# Patient Record
Sex: FEMALE | HISPANIC OR LATINO | URBAN - METROPOLITAN AREA
[De-identification: names, ages, dates, MRNs, and addresses within clinical notes are randomized per-mention and may not be internally consistent; named-entity substitution may affect disease eponyms.]

---

## 2018-04-10 ENCOUNTER — EMERGENCY (EMERGENCY)
Facility: HOSPITAL | Age: 33
LOS: 0 days | Discharge: HOME | End: 2018-04-10
Attending: EMERGENCY MEDICINE | Admitting: EMERGENCY MEDICINE
Payer: MEDICAID

## 2018-04-10 VITALS
DIASTOLIC BLOOD PRESSURE: 80 MMHG | SYSTOLIC BLOOD PRESSURE: 131 MMHG | OXYGEN SATURATION: 100 % | HEART RATE: 83 BPM | RESPIRATION RATE: 18 BRPM | TEMPERATURE: 98 F

## 2018-04-10 VITALS — HEIGHT: 61 IN | WEIGHT: 149.91 LBS

## 2018-04-10 DIAGNOSIS — O03.9 COMPLETE OR UNSPECIFIED SPONTANEOUS ABORTION WITHOUT COMPLICATION: ICD-10-CM

## 2018-04-10 DIAGNOSIS — N93.9 ABNORMAL UTERINE AND VAGINAL BLEEDING, UNSPECIFIED: ICD-10-CM

## 2018-04-10 LAB
APPEARANCE UR: CLEAR — SIGNIFICANT CHANGE UP
APTT BLD: 30.5 SEC — SIGNIFICANT CHANGE UP (ref 27.5–37.4)
BACTERIA # UR AUTO: (no result)
BASOPHILS # BLD AUTO: 0.03 K/UL — SIGNIFICANT CHANGE UP (ref 0–0.2)
BASOPHILS NFR BLD AUTO: 0.4 % — SIGNIFICANT CHANGE UP (ref 0–2)
BILIRUB UR-MCNC: NEGATIVE — SIGNIFICANT CHANGE UP
COLOR SPEC: YELLOW — SIGNIFICANT CHANGE UP
DIFF PNL FLD: (no result)
EOSINOPHIL # BLD AUTO: 0.08 K/UL — SIGNIFICANT CHANGE UP (ref 0–0.5)
EOSINOPHIL NFR BLD AUTO: 1 % — SIGNIFICANT CHANGE UP (ref 0–6)
EPI CELLS # UR: SIGNIFICANT CHANGE UP
GLUCOSE UR QL: NEGATIVE MG/DL — SIGNIFICANT CHANGE UP
HCG SERPL-ACNC: 1385 MIU/ML — SIGNIFICANT CHANGE UP
HCT VFR BLD CALC: 41.5 % — SIGNIFICANT CHANGE UP (ref 34.5–45)
HGB BLD-MCNC: 14.2 G/DL — SIGNIFICANT CHANGE UP (ref 11.5–15.5)
IMM GRANULOCYTES NFR BLD AUTO: 0.3 % — SIGNIFICANT CHANGE UP (ref 0–1.5)
INR BLD: 1.04 RATIO — SIGNIFICANT CHANGE UP (ref 0.88–1.16)
KETONES UR-MCNC: NEGATIVE — SIGNIFICANT CHANGE UP
LEUKOCYTE ESTERASE UR-ACNC: (no result)
LYMPHOCYTES # BLD AUTO: 2.5 K/UL — SIGNIFICANT CHANGE UP (ref 1–3.3)
LYMPHOCYTES # BLD AUTO: 32.7 % — SIGNIFICANT CHANGE UP (ref 13–44)
MCHC RBC-ENTMCNC: 30.9 PG — SIGNIFICANT CHANGE UP (ref 27–34)
MCHC RBC-ENTMCNC: 34.2 GM/DL — SIGNIFICANT CHANGE UP (ref 32–36)
MCV RBC AUTO: 90.2 FL — SIGNIFICANT CHANGE UP (ref 80–100)
MONOCYTES # BLD AUTO: 0.65 K/UL — SIGNIFICANT CHANGE UP (ref 0–0.9)
MONOCYTES NFR BLD AUTO: 8.5 % — SIGNIFICANT CHANGE UP (ref 2–14)
NEUTROPHILS # BLD AUTO: 4.36 K/UL — SIGNIFICANT CHANGE UP (ref 1.8–7.4)
NEUTROPHILS NFR BLD AUTO: 57.1 % — SIGNIFICANT CHANGE UP (ref 43–77)
NITRITE UR-MCNC: NEGATIVE — SIGNIFICANT CHANGE UP
NRBC # BLD: 0 /100 WBCS — SIGNIFICANT CHANGE UP (ref 0–0)
PH UR: 6 — SIGNIFICANT CHANGE UP (ref 5–8)
PLATELET # BLD AUTO: 281 K/UL — SIGNIFICANT CHANGE UP (ref 150–400)
PROT UR-MCNC: 15 MG/DL
PROTHROM AB SERPL-ACNC: 11.2 SEC — SIGNIFICANT CHANGE UP (ref 9.8–12.7)
RBC # BLD: 4.6 M/UL — SIGNIFICANT CHANGE UP (ref 3.8–5.2)
RBC # FLD: 11.7 % — SIGNIFICANT CHANGE UP (ref 10.3–14.5)
RBC CASTS # UR COMP ASSIST: (no result) /HPF (ref 0–4)
SP GR SPEC: 1.01 — SIGNIFICANT CHANGE UP (ref 1.01–1.02)
UROBILINOGEN FLD QL: NEGATIVE MG/DL — SIGNIFICANT CHANGE UP
WBC # BLD: 7.64 K/UL — SIGNIFICANT CHANGE UP (ref 3.8–10.5)
WBC # FLD AUTO: 7.64 K/UL — SIGNIFICANT CHANGE UP (ref 3.8–10.5)
WBC UR QL: (no result)

## 2018-04-10 PROCEDURE — 99285 EMERGENCY DEPT VISIT HI MDM: CPT

## 2018-04-10 PROCEDURE — 76830 TRANSVAGINAL US NON-OB: CPT | Mod: 26

## 2018-04-10 NOTE — ED STATDOCS - PROGRESS NOTE DETAILS
32 yr. old female PMH: Pregnant LMP 1/21/18 EDC 10/29  presents to ED with vaginal bleeding and cramps onset 10am while at work. Patient's first pregnancy. Ultrasound done yesterday at Dr. Eaton and Dr. Cordon. Denies any excessive bleeding. Seen and examined by attending in [place]. Plan: IV, Labs and US. Will F/U with results and re evaluate. Lela NP Reported results of Lab work and low beta HCG, reported US results and told she is having a miscarriage. Patient instructed to F/U with Dr. Cordon for another beta HCG and US. also told she will have increased vaginal bleeding. Lela BYRD    # [id number].

## 2018-04-10 NOTE — ED ADULT NURSE NOTE - OBJECTIVE STATEMENT
pt presents to ED with lower back pain and hematuria. pt c/o lower abd pain. denies vaginal bleeding. pt states she is 2 months pregnant. a&ox3. breathing si even and unlabored. afebrile. will continue to monitor and reassess

## 2018-04-10 NOTE — ED STATDOCS - OBJECTIVE STATEMENT
32 y-o Female presents to the ED c/o of vaginal bleeding with cramps. Pt states she is approximately two months pregnant with first pregnancy. Reports mild vaginal bleeding x 2 hrs ago. LNMP 1/21/18. Pt notes internal sonogram yesterday with Dr Abdi. Denies N/V/D, excessive hemorrhaging.

## 2018-04-10 NOTE — ED STATDOCS - MEDICAL DECISION MAKING DETAILS
32 y-o F presents with mild vaginal bleeding, x 2 months pregnant. Will give labs UA, US transvaginal

## 2018-04-12 ENCOUNTER — INPATIENT (INPATIENT)
Facility: HOSPITAL | Age: 33
LOS: 1 days | Discharge: HOME | End: 2018-04-14
Attending: OBSTETRICS & GYNECOLOGY | Admitting: OBSTETRICS & GYNECOLOGY
Payer: MEDICAID

## 2018-04-12 VITALS — HEIGHT: 61 IN | WEIGHT: 147.05 LBS

## 2018-04-12 DIAGNOSIS — D64.9 ANEMIA, UNSPECIFIED: ICD-10-CM

## 2018-04-12 DIAGNOSIS — O03.4 INCOMPLETE SPONTANEOUS ABORTION WITHOUT COMPLICATION: ICD-10-CM

## 2018-04-12 LAB
-  AMIKACIN: SIGNIFICANT CHANGE UP
-  AMOXICILLIN/CLAVULANIC ACID: SIGNIFICANT CHANGE UP
-  AMPICILLIN/SULBACTAM: SIGNIFICANT CHANGE UP
-  AMPICILLIN: SIGNIFICANT CHANGE UP
-  AZTREONAM: SIGNIFICANT CHANGE UP
-  CEFAZOLIN: SIGNIFICANT CHANGE UP
-  CEFEPIME: SIGNIFICANT CHANGE UP
-  CEFOXITIN: SIGNIFICANT CHANGE UP
-  CEFTRIAXONE: SIGNIFICANT CHANGE UP
-  CIPROFLOXACIN: SIGNIFICANT CHANGE UP
-  ERTAPENEM: SIGNIFICANT CHANGE UP
-  GENTAMICIN: SIGNIFICANT CHANGE UP
-  IMIPENEM: SIGNIFICANT CHANGE UP
-  LEVOFLOXACIN: SIGNIFICANT CHANGE UP
-  MEROPENEM: SIGNIFICANT CHANGE UP
-  NITROFURANTOIN: SIGNIFICANT CHANGE UP
-  PIPERACILLIN/TAZOBACTAM: SIGNIFICANT CHANGE UP
-  TIGECYCLINE: SIGNIFICANT CHANGE UP
-  TOBRAMYCIN: SIGNIFICANT CHANGE UP
-  TRIMETHOPRIM/SULFAMETHOXAZOLE: SIGNIFICANT CHANGE UP
ALBUMIN SERPL ELPH-MCNC: 3.5 G/DL — SIGNIFICANT CHANGE UP (ref 3.3–5)
ALP SERPL-CCNC: 50 U/L — SIGNIFICANT CHANGE UP (ref 40–120)
ALT FLD-CCNC: 16 U/L — SIGNIFICANT CHANGE UP (ref 12–78)
ANION GAP SERPL CALC-SCNC: 8 MMOL/L — SIGNIFICANT CHANGE UP (ref 5–17)
APTT BLD: 28.9 SEC — SIGNIFICANT CHANGE UP (ref 27.5–37.4)
AST SERPL-CCNC: 13 U/L — LOW (ref 15–37)
BASOPHILS # BLD AUTO: 0.02 K/UL — SIGNIFICANT CHANGE UP (ref 0–0.2)
BASOPHILS # BLD AUTO: 0.03 K/UL — SIGNIFICANT CHANGE UP (ref 0–0.2)
BASOPHILS NFR BLD AUTO: 0.1 % — SIGNIFICANT CHANGE UP (ref 0–2)
BASOPHILS NFR BLD AUTO: 0.2 % — SIGNIFICANT CHANGE UP (ref 0–2)
BILIRUB SERPL-MCNC: 0.4 MG/DL — SIGNIFICANT CHANGE UP (ref 0.2–1.2)
BLD GP AB SCN SERPL QL: SIGNIFICANT CHANGE UP
BUN SERPL-MCNC: 12 MG/DL — SIGNIFICANT CHANGE UP (ref 7–23)
CALCIUM SERPL-MCNC: 8.4 MG/DL — LOW (ref 8.5–10.1)
CHLORIDE SERPL-SCNC: 104 MMOL/L — SIGNIFICANT CHANGE UP (ref 96–108)
CO2 SERPL-SCNC: 26 MMOL/L — SIGNIFICANT CHANGE UP (ref 22–31)
CREAT SERPL-MCNC: 0.9 MG/DL — SIGNIFICANT CHANGE UP (ref 0.5–1.3)
CULTURE RESULTS: SIGNIFICANT CHANGE UP
EOSINOPHIL # BLD AUTO: 0 K/UL — SIGNIFICANT CHANGE UP (ref 0–0.5)
EOSINOPHIL # BLD AUTO: 0.01 K/UL — SIGNIFICANT CHANGE UP (ref 0–0.5)
EOSINOPHIL NFR BLD AUTO: 0 % — SIGNIFICANT CHANGE UP (ref 0–6)
EOSINOPHIL NFR BLD AUTO: 0.1 % — SIGNIFICANT CHANGE UP (ref 0–6)
GLUCOSE SERPL-MCNC: 134 MG/DL — HIGH (ref 70–99)
HCT VFR BLD CALC: 20.3 % — CRITICAL LOW (ref 34.5–45)
HCT VFR BLD CALC: 23.9 % — LOW (ref 34.5–45)
HCT VFR BLD CALC: 31.1 % — LOW (ref 34.5–45)
HGB BLD-MCNC: 10.8 G/DL — LOW (ref 11.5–15.5)
HGB BLD-MCNC: 6.9 G/DL — CRITICAL LOW (ref 11.5–15.5)
HGB BLD-MCNC: 8.3 G/DL — LOW (ref 11.5–15.5)
IMM GRANULOCYTES NFR BLD AUTO: 0.4 % — SIGNIFICANT CHANGE UP (ref 0–1.5)
IMM GRANULOCYTES NFR BLD AUTO: 0.5 % — SIGNIFICANT CHANGE UP (ref 0–1.5)
INR BLD: 1.05 RATIO — SIGNIFICANT CHANGE UP (ref 0.88–1.16)
LYMPHOCYTES # BLD AUTO: 1.38 K/UL — SIGNIFICANT CHANGE UP (ref 1–3.3)
LYMPHOCYTES # BLD AUTO: 15.2 % — SIGNIFICANT CHANGE UP (ref 13–44)
LYMPHOCYTES # BLD AUTO: 2.67 K/UL — SIGNIFICANT CHANGE UP (ref 1–3.3)
LYMPHOCYTES # BLD AUTO: 8.4 % — LOW (ref 13–44)
MCHC RBC-ENTMCNC: 31.2 PG — SIGNIFICANT CHANGE UP (ref 27–34)
MCHC RBC-ENTMCNC: 31.7 PG — SIGNIFICANT CHANGE UP (ref 27–34)
MCHC RBC-ENTMCNC: 34.7 GM/DL — SIGNIFICANT CHANGE UP (ref 32–36)
MCHC RBC-ENTMCNC: 34.7 GM/DL — SIGNIFICANT CHANGE UP (ref 32–36)
MCV RBC AUTO: 89.9 FL — SIGNIFICANT CHANGE UP (ref 80–100)
MCV RBC AUTO: 91.2 FL — SIGNIFICANT CHANGE UP (ref 80–100)
METHOD TYPE: SIGNIFICANT CHANGE UP
MONOCYTES # BLD AUTO: 0.23 K/UL — SIGNIFICANT CHANGE UP (ref 0–0.9)
MONOCYTES # BLD AUTO: 0.41 K/UL — SIGNIFICANT CHANGE UP (ref 0–0.9)
MONOCYTES NFR BLD AUTO: 1.4 % — LOW (ref 2–14)
MONOCYTES NFR BLD AUTO: 2.3 % — SIGNIFICANT CHANGE UP (ref 2–14)
NEUTROPHILS # BLD AUTO: 14.44 K/UL — HIGH (ref 1.8–7.4)
NEUTROPHILS # BLD AUTO: 14.63 K/UL — HIGH (ref 1.8–7.4)
NEUTROPHILS NFR BLD AUTO: 81.9 % — HIGH (ref 43–77)
NEUTROPHILS NFR BLD AUTO: 89.5 % — HIGH (ref 43–77)
NRBC # BLD: 0 /100 WBCS — SIGNIFICANT CHANGE UP (ref 0–0)
NRBC # BLD: 0 /100 WBCS — SIGNIFICANT CHANGE UP (ref 0–0)
ORGANISM # SPEC MICROSCOPIC CNT: SIGNIFICANT CHANGE UP
ORGANISM # SPEC MICROSCOPIC CNT: SIGNIFICANT CHANGE UP
PLATELET # BLD AUTO: 239 K/UL — SIGNIFICANT CHANGE UP (ref 150–400)
PLATELET # BLD AUTO: 270 K/UL — SIGNIFICANT CHANGE UP (ref 150–400)
POTASSIUM SERPL-MCNC: 4.1 MMOL/L — SIGNIFICANT CHANGE UP (ref 3.5–5.3)
POTASSIUM SERPL-SCNC: 4.1 MMOL/L — SIGNIFICANT CHANGE UP (ref 3.5–5.3)
PROT SERPL-MCNC: 6.7 GM/DL — SIGNIFICANT CHANGE UP (ref 6–8.3)
PROTHROM AB SERPL-ACNC: 11.4 SEC — SIGNIFICANT CHANGE UP (ref 9.8–12.7)
RBC # BLD: 2.62 M/UL — LOW (ref 3.8–5.2)
RBC # BLD: 3.46 M/UL — LOW (ref 3.8–5.2)
RBC # FLD: 11.6 % — SIGNIFICANT CHANGE UP (ref 10.3–14.5)
RBC # FLD: 11.6 % — SIGNIFICANT CHANGE UP (ref 10.3–14.5)
SODIUM SERPL-SCNC: 138 MMOL/L — SIGNIFICANT CHANGE UP (ref 135–145)
SPECIMEN SOURCE: SIGNIFICANT CHANGE UP
TYPE + AB SCN PNL BLD: SIGNIFICANT CHANGE UP
WBC # BLD: 16.35 K/UL — HIGH (ref 3.8–10.5)
WBC # BLD: 17.62 K/UL — HIGH (ref 3.8–10.5)
WBC # FLD AUTO: 16.35 K/UL — HIGH (ref 3.8–10.5)
WBC # FLD AUTO: 17.62 K/UL — HIGH (ref 3.8–10.5)

## 2018-04-12 PROCEDURE — 93010 ELECTROCARDIOGRAM REPORT: CPT

## 2018-04-12 PROCEDURE — 99285 EMERGENCY DEPT VISIT HI MDM: CPT

## 2018-04-12 PROCEDURE — 76817 TRANSVAGINAL US OBSTETRIC: CPT | Mod: 26

## 2018-04-12 RX ORDER — ONDANSETRON 8 MG/1
4 TABLET, FILM COATED ORAL ONCE
Qty: 0 | Refills: 0 | Status: COMPLETED | OUTPATIENT
Start: 2018-04-12 | End: 2018-04-12

## 2018-04-12 RX ORDER — SODIUM CHLORIDE 9 MG/ML
1000 INJECTION INTRAMUSCULAR; INTRAVENOUS; SUBCUTANEOUS ONCE
Qty: 0 | Refills: 0 | Status: COMPLETED | OUTPATIENT
Start: 2018-04-12 | End: 2018-04-12

## 2018-04-12 RX ORDER — SODIUM CHLORIDE 9 MG/ML
2000 INJECTION INTRAMUSCULAR; INTRAVENOUS; SUBCUTANEOUS ONCE
Qty: 0 | Refills: 0 | Status: COMPLETED | OUTPATIENT
Start: 2018-04-12 | End: 2018-04-12

## 2018-04-12 RX ADMIN — SODIUM CHLORIDE 2000 MILLILITER(S): 9 INJECTION INTRAMUSCULAR; INTRAVENOUS; SUBCUTANEOUS at 19:12

## 2018-04-12 RX ADMIN — SODIUM CHLORIDE 2000 MILLILITER(S): 9 INJECTION INTRAMUSCULAR; INTRAVENOUS; SUBCUTANEOUS at 17:04

## 2018-04-12 RX ADMIN — ONDANSETRON 4 MILLIGRAM(S): 8 TABLET, FILM COATED ORAL at 18:00

## 2018-04-12 RX ADMIN — Medication 0.2 MILLIGRAM(S): at 23:14

## 2018-04-12 RX ADMIN — Medication 0.2 MILLIGRAM(S): at 19:12

## 2018-04-12 NOTE — ED PROCEDURE NOTE - CPROC ED ANATOMIC LOCATION1
RUE: LAC#1 RIGHT FOREARM: +4.0 cm stellate shaped superficial LAC noted volar aspect of right forearm. NVI. No tendon deficit distal to injury. FROM. No bony deformity. 2+ distal pulses. No bleeding. No FBs.

## 2018-04-12 NOTE — PATIENT PROFILE ADULT. - LANGUAGE ASSISTANCE NEEDED
No-Patient/Caregiver offered and refused free interpretation services./Prefers brother in law to translate

## 2018-04-12 NOTE — ED PROVIDER NOTE - OBJECTIVE STATEMENT
33 y/o F with no significant PMHX presenting to the ED c/o of Vaginal bleeding with associated cramping since last night. Pt notes she is 8 weeks pregnant, first pregnancy, , reports going through more then ten pads ( more then one and hour). OBGYN Dr Luu. Pt given Misoprostol, which she took 600mg at 2pm. Pt also notes she has been vomiting x 2 with diarrhea x2 with associated Nausea.

## 2018-04-12 NOTE — ED STATDOCS - PHYSICAL EXAMINATION
GEN: AOX3, appears pale. HEENT: Throat clear. Head NC/AT. NECK: Supple, No JVD. FROM. C-spine non-tender. CV:S1S2, RRR, LUNGS: CTA b/l, no w/r/r. ABD: Soft, +Mild diffuse tenderness. no rebound, no guarding. No CVAT. EXT: No e/c/c. 2+ distal pulses. SKIN: No rashes. NEURO: No focal deficits. CN II-XII intact. FROM. 5/5 motor and sensory. LUIS ALBERTO Church

## 2018-04-12 NOTE — ED PROCEDURE NOTE - CPROC ED POST PROC CARE GUIDE1
Verbal/written post procedure instructions were given to patient/caregiver./Instructed patient/caregiver regarding signs and symptoms of infection./Keep the cast/splint/dressing clean and dry./Instructed patient/caregiver to follow-up with primary care physician.
Verbal/written post procedure instructions were given to patient/caregiver./Instructed patient/caregiver to follow-up with primary care physician./Instructed patient/caregiver regarding signs and symptoms of infection./Keep the cast/splint/dressing clean and dry.

## 2018-04-12 NOTE — ED STATDOCS - OBJECTIVE STATEMENT
32 y-o Female with no significant PMHX presents to the ED c/o of Vaginal bleeding with associated cramping since last night. Pt notes she is 8 weeks pregnant, Pt notes this is first pregnancy. Pt notes she has gone through more then ten pads ( more then one and hour). OBGYN Dr Luu. Pt given Misoprostol, which she took 600mg at 2pm. Pt also notes she has been vomiting x 2 with diarrhea x2 with associated Nausea. [place] interrupter #[id number]

## 2018-04-12 NOTE — ED STATDOCS - ENMT, MLM
Nasal mucosa clear.  Mouth with dry mucosa.  Throat has no vesicles, no oropharyngeal exudates and uvula is midline.

## 2018-04-12 NOTE — H&P ADULT - NSHPPHYSICALEXAM_GEN_ALL_CORE
Height (cm): 154.94 (04-12 @ 16:28), 154.94 (04-10 @ 11:40)  Weight (kg): 66.7 (04-12 @ 16:28), 68 (04-10 @ 11:40)  BMI (kg/m2): 27.8 (04-12 @ 16:28), 28.3 (04-10 @ 11:40)  BSA (m2): 1.66 (04-12 @ 16:28), 1.67 (04-10 @ 11:40)  Vital Signs Last 24 Hrs  T(C): 37.2 (12 Apr 2018 16:31), Max: 37.2 (12 Apr 2018 16:31)  T(F): 99 (12 Apr 2018 16:31), Max: 99 (12 Apr 2018 16:31)  HR: 90 (12 Apr 2018 18:00) (90 - 110)  BP: 115/55 (12 Apr 2018 18:00) (110/70 - 124/87)  BP(mean): --  RR: 18 (12 Apr 2018 18:00) (16 - 18)  SpO2: 99% (12 Apr 2018 18:00) (99% - 100%)  [ ] room air   [ ] 02    PHYSICAL EXAM:  GENERAL: pale  HEAD:  atraumatic, normocephalic   NERVOUS SYSTEM: AAOx3  CHEST/LUNG: Clear to auscultation bilaterally  HEART:  Regular rate and rhythm, No murmurs, rubs, or gallops  ABDOMEN:  soft, nontender, nondistended, positive bowel sounds   EXTREMITIES: No clubbing, cyanosis or edema  speculum exam, Cervex: open cervical os, no active bleeding, removed gestational tissues

## 2018-04-12 NOTE — H&P ADULT - HISTORY OF PRESENT ILLNESS
31 y/o F with no significant PMHX  presenting to the ED c/o of Vaginal bleeding with associated cramping since last night. Pt notes she is 8 weeks pregnant, reports going through more then ten pads since last night. Pt was seen in the office on Wed by Dr. Cordon and found to have fetal demise and was prescribed Misoprostol for which pt states took it today ~12pm and admits to increased bleeding with associated lightheadedness, n/v. Pt come to ED for further eval. In the ED pt was found H/H of 8.3/23.9 baseline 14 2 days ago. Pt was RRT for syncope during US evaluation after passing large clot. Pt denies cp, palpitations, fever, chills.

## 2018-04-12 NOTE — H&P ADULT - ASSESSMENT
31 y/o F with no significant PMHX  presenting to the ED c/o of Vaginal bleeding with associated cramping since last night admitted for incomplete .

## 2018-04-12 NOTE — H&P ADULT - PROBLEM SELECTOR PLAN 1
US transvaginal showed Large gestational sac in the cervix   speculum exam gestational tissue removed, no active bleeding at this time  -started on Methergine .2mg Q4 x 4 doses  -Repeat H/H at 8pm  -type and cross  -transfused as needed  -repeat US if still retained product of conception consider D&C

## 2018-04-12 NOTE — ED STATDOCS - ATTENDING CONTRIBUTION TO CARE
I, Eloisa Chang MD,  performed the initial face to face bedside interview with this patient regarding history of present illness, review of symptoms and relevant past medical, social and family history.  I completed an independent physical examination.  I was the initial provider who evaluated this patient. I have signed out the follow up of any pending tests (i.e. labs, radiological studies) to the ACP.  I have communicated the patient’s plan of care and disposition with the ACP.  The history, relevant review of systems, past medical and surgical history, medical decision making, and physical examination was documented by the scribe in my presence and I attest to the accuracy of the documentation.

## 2018-04-12 NOTE — ED STATDOCS - PROGRESS NOTE DETAILS
Attending Chang, rapid response called for pt while in u/s - pt with large vaginal bleed and then syncope.  Pt arrousable, plan ekg, repeat cbc.  OB called and spoke resident consult. Attending Chang, rapid response called for pt while in u/s - pt with large vaginal bleed and then syncope.  Pt arousable, plan ekg, repeat cbc.  OB called and spoke resident consult. Attending Chang, rapid response called for pt while in u/s - pt with large vaginal bleed and then syncope.  Pt moved to trauma rooms.  Pt arousable, plan ekg, repeat cbc.  OB called and spoke resident consult.

## 2018-04-12 NOTE — ED PROVIDER NOTE - PROGRESS NOTE DETAILS
Called to see pt in rapid response, just passed large clot with syncopal episode, pale, diaphoretic, opening arms, moving extremities, stat EKG ordered, will repeat CBC. Case discussed with Dr. Sommers on call for OB, recommends Methergine to be given right now. Will come down to bedside to see pt. Kera DO: to be admitted to ob/gyn service.

## 2018-04-12 NOTE — ED PROVIDER NOTE - CONSTITUTIONAL, MLM
normal... Pale appearing, well nourished, awake, alert, oriented to person, place, time/situation and in no apparent distress. Pale appearing,  awake, alert, oriented to person, place, time/situation and in no apparent distress.

## 2018-04-12 NOTE — H&P ADULT - PROBLEM SELECTOR PLAN 2
acute due to acute blood loss   -f/u repeat h/h, transfused as needed   -type and screen  -continue Methergine

## 2018-04-12 NOTE — ED POST DISCHARGE NOTE - DETAILS
Héctor Carr PA signed Marietta Pinto PA-C  ID [id number]. Left message for call back. Will send certified letter.

## 2018-04-12 NOTE — CHART NOTE - NSCHARTNOTEFT_GEN_A_CORE
Attending Note   pt seen in ED and examined.  pt with missed ab  treated with cytotec in doctors office.  pt questionably 8 weeks.    pt presented to ED with cramping and bleeding   sono revealed gestational sac in cervix  POC/gest sac removed from cervix in ED  bleeding sig decreased   will repeat CBC at 8pm and get repeat sono to assess for retained POC  may need to consider transfusion if HCT has decreased any further.  pt signed out to dr. adler  ob/gyn on call  case reviewed and discussed with resident  md saurabh

## 2018-04-12 NOTE — H&P ADULT - NSHPREVIEWOFSYSTEMS_GEN_ALL_CORE
REVIEW OF SYSTEMS:  CONSTITUTIONAL: No fever, chills,  fatigue   EYES: No  visual disturbances  RESPIRATORY: No cough, wheezing, No  hemoptysis; No shortness of breath  CARDIOVASCULAR: No chest pain, palpitations, leg swelling  GASTROINTESTINAL: No abdominal or epigastric pain. No nausea, No vomiting; No diarrhea or constipation.  GENITOURINARY: No dysuria, No frequency, No urgency, No hematuria, or incontinence  NEUROLOGICAL: alert and oriented x 3,  No headaches, No dizziness, No numbness,  MUSCULOSKELETAL: No joint pain or swelling; No muscle pain, No back pain, No extremity pain

## 2018-04-12 NOTE — ED ADULT NURSE REASSESSMENT NOTE - NS ED NURSE REASSESS COMMENT FT1
pt sent to ultrasound , during sonogram pt had episode of loose stool , moderate amount of blood , possible product of conception onto floor , pt appeared more pale, and had episode of unreponsiveness, rapid response called , pt sent to main ED ,  additional IV inserted , NS infusing

## 2018-04-12 NOTE — ED PROCEDURE NOTE - CPROC ED ANATOMIC LOCATION1
LAC#2 RIGHT ANTERIOR ELBOW: +2.0cm V-shaped superficial LAC noted volar aspect of right elbow. FROM. NO bony deformity. NVI. No tendon deficit distal to injury. 2+ distal pulses.

## 2018-04-13 LAB
APPEARANCE UR: (no result)
BACTERIA # UR AUTO: (no result)
BILIRUB UR-MCNC: NEGATIVE — SIGNIFICANT CHANGE UP
COLOR SPEC: (no result)
DIFF PNL FLD: (no result)
EPI CELLS # UR: (no result)
GLUCOSE UR QL: NEGATIVE MG/DL — SIGNIFICANT CHANGE UP
HCT VFR BLD CALC: 18.9 % — CRITICAL LOW (ref 34.5–45)
HCT VFR BLD CALC: 21.5 % — LOW (ref 34.5–45)
HGB BLD-MCNC: 6.4 G/DL — CRITICAL LOW (ref 11.5–15.5)
HGB BLD-MCNC: 7.3 G/DL — LOW (ref 11.5–15.5)
KETONES UR-MCNC: NEGATIVE — SIGNIFICANT CHANGE UP
LEUKOCYTE ESTERASE UR-ACNC: (no result)
MCHC RBC-ENTMCNC: 29.1 PG — SIGNIFICANT CHANGE UP (ref 27–34)
MCHC RBC-ENTMCNC: 33.9 GM/DL — SIGNIFICANT CHANGE UP (ref 32–36)
MCV RBC AUTO: 85.9 FL — SIGNIFICANT CHANGE UP (ref 80–100)
NITRITE UR-MCNC: NEGATIVE — SIGNIFICANT CHANGE UP
PH UR: 5 — SIGNIFICANT CHANGE UP (ref 5–8)
PLATELET # BLD AUTO: 186 K/UL — SIGNIFICANT CHANGE UP (ref 150–400)
PROT UR-MCNC: 30 MG/DL
RBC # BLD: 2.2 M/UL — LOW (ref 3.8–5.2)
RBC # FLD: 16.2 % — HIGH (ref 10.3–14.5)
RBC CASTS # UR COMP ASSIST: >50 /HPF (ref 0–4)
SP GR SPEC: 1.01 — SIGNIFICANT CHANGE UP (ref 1.01–1.02)
UROBILINOGEN FLD QL: NEGATIVE MG/DL — SIGNIFICANT CHANGE UP
WBC # BLD: 11.5 K/UL — HIGH (ref 3.8–10.5)
WBC # FLD AUTO: 11.5 K/UL — HIGH (ref 3.8–10.5)
WBC UR QL: SIGNIFICANT CHANGE UP

## 2018-04-13 PROCEDURE — 76830 TRANSVAGINAL US NON-OB: CPT | Mod: 26

## 2018-04-13 PROCEDURE — 88305 TISSUE EXAM BY PATHOLOGIST: CPT | Mod: 26

## 2018-04-13 RX ORDER — CALCIUM CARBONATE 500(1250)
3 TABLET ORAL EVERY 6 HOURS
Qty: 0 | Refills: 0 | Status: DISCONTINUED | OUTPATIENT
Start: 2018-04-13 | End: 2018-04-14

## 2018-04-13 RX ORDER — MAGNESIUM HYDROXIDE 400 MG/1
30 TABLET, CHEWABLE ORAL EVERY 6 HOURS
Qty: 0 | Refills: 0 | Status: DISCONTINUED | OUTPATIENT
Start: 2018-04-13 | End: 2018-04-14

## 2018-04-13 RX ORDER — FENTANYL CITRATE 50 UG/ML
50 INJECTION INTRAVENOUS
Qty: 0 | Refills: 0 | Status: DISCONTINUED | OUTPATIENT
Start: 2018-04-13 | End: 2018-04-13

## 2018-04-13 RX ORDER — ACETAMINOPHEN 500 MG
1000 TABLET ORAL ONCE
Qty: 0 | Refills: 0 | Status: COMPLETED | OUTPATIENT
Start: 2018-04-13 | End: 2018-04-13

## 2018-04-13 RX ORDER — ACETAMINOPHEN 500 MG
650 TABLET ORAL EVERY 6 HOURS
Qty: 0 | Refills: 0 | Status: DISCONTINUED | OUTPATIENT
Start: 2018-04-13 | End: 2018-04-13

## 2018-04-13 RX ORDER — SODIUM CHLORIDE 9 MG/ML
1000 INJECTION INTRAMUSCULAR; INTRAVENOUS; SUBCUTANEOUS
Qty: 0 | Refills: 0 | Status: DISCONTINUED | OUTPATIENT
Start: 2018-04-13 | End: 2018-04-13

## 2018-04-13 RX ORDER — SODIUM CHLORIDE 9 MG/ML
1000 INJECTION, SOLUTION INTRAVENOUS
Qty: 0 | Refills: 0 | Status: DISCONTINUED | OUTPATIENT
Start: 2018-04-13 | End: 2018-04-14

## 2018-04-13 RX ORDER — ZOLPIDEM TARTRATE 10 MG/1
5 TABLET ORAL AT BEDTIME
Qty: 0 | Refills: 0 | Status: DISCONTINUED | OUTPATIENT
Start: 2018-04-13 | End: 2018-04-14

## 2018-04-13 RX ORDER — MORPHINE SULFATE 50 MG/1
4 CAPSULE, EXTENDED RELEASE ORAL EVERY 4 HOURS
Qty: 0 | Refills: 0 | Status: DISCONTINUED | OUTPATIENT
Start: 2018-04-13 | End: 2018-04-14

## 2018-04-13 RX ORDER — KETOROLAC TROMETHAMINE 30 MG/ML
30 SYRINGE (ML) INJECTION ONCE
Qty: 0 | Refills: 0 | Status: DISCONTINUED | OUTPATIENT
Start: 2018-04-13 | End: 2018-04-14

## 2018-04-13 RX ORDER — OXYCODONE AND ACETAMINOPHEN 5; 325 MG/1; MG/1
1 TABLET ORAL EVERY 4 HOURS
Qty: 0 | Refills: 0 | Status: DISCONTINUED | OUTPATIENT
Start: 2018-04-13 | End: 2018-04-14

## 2018-04-13 RX ADMIN — Medication 0.2 MILLIGRAM(S): at 02:49

## 2018-04-13 RX ADMIN — Medication 0.2 MILLIGRAM(S): at 06:39

## 2018-04-13 RX ADMIN — SODIUM CHLORIDE 75 MILLILITER(S): 9 INJECTION, SOLUTION INTRAVENOUS at 21:38

## 2018-04-13 RX ADMIN — SODIUM CHLORIDE 75 MILLILITER(S): 9 INJECTION INTRAMUSCULAR; INTRAVENOUS; SUBCUTANEOUS at 21:28

## 2018-04-13 RX ADMIN — Medication 650 MILLIGRAM(S): at 00:50

## 2018-04-13 RX ADMIN — Medication 650 MILLIGRAM(S): at 00:21

## 2018-04-13 RX ADMIN — Medication 400 MILLIGRAM(S): at 21:28

## 2018-04-13 NOTE — PROGRESS NOTE ADULT - SUBJECTIVE AND OBJECTIVE BOX
HD#1  Completing Missed  with anemia    Pt denies sig uterine cramping, has much lighter vaginal bleeding now.  Understands the plan of mgt for today.    Vital Signs Last 24 Hrs  T(C): 37.2 (2018 00:01), Max: 37.6 (2018 22:00)  T(F): 99 (2018 00:01), Max: 99.6 (2018 22:00)  HR: 87 (2018 00:01) (87 - 110)  BP: 106/47 (2018 00:01) (100/42 - 124/87)  RR: 17 (2018 00:01) (16 - 19)  SpO2: 100% (2018 00:01) (99% - 100%)    Gen:  appropriate, pallor, appears fatigued  Pul:  clear  Cor:  mild sinus tachycardia  Abd:  soft, NT  :  no active bright red bleeding.  Extr:  NT                          7.3    x     )-----------( x        ( 2018 06:45 )             21.5     A/P:  Completing passage of missed  with anemia  Hgb stable at 7.3 from 6.9, no active bleeding presently.  Pending repeat transvaginal sonogram for possible retained products of conception.  Discharge home if completed ab per sono and hemodynamically stable.  Suction D+C if incomplete ab per sono.    No indication for transfusion at this time.

## 2018-04-13 NOTE — PROGRESS NOTE ADULT - SUBJECTIVE AND OBJECTIVE BOX
US reviewed, retained products of conception  patient felt dizzy earlier during the day and feels palpitations  P-100  discussed cytotec VS D and C with patient   Patient and family wishes to proceed with D and C. Risks benefits alternatives, including possibility of blood transfusion discussed with patient. Verbalized understanding, agrees with D and C.  NPO  Prepare for Surgery  Dr Cordon informed

## 2018-04-13 NOTE — PROGRESS NOTE ADULT - SUBJECTIVE AND OBJECTIVE BOX
Post Op Note    Pt denies any complaints postoperatively from suction D+C.  Questions from family answered.  Discharge in am anticipated.    Vital Signs Last 24 Hrs  T(C): 36.5 (2018 22:15), Max: 37.6 (2018 16:43)  T(F): 97.7 (2018 22:15), Max: 99.6 (2018 16:43)  HR: 98 (2018 23:00) (87 - 106)  BP: 105/55 (2018 23:00) (90/40 - 115/57)  RR: 18 (2018 23:00) (13 - 23)  SpO2: 100% (2018 23:00) (99% - 100%)    Cor:  S1S2 RRR  Pul:  clear  Abd:  soft, NT  :  no active bleeding  Extr:  NT                          6.4    11.50 )-----------( 186      ( 2018 23:08 )             18.9     A/P:  Suction D+C for incomplete , symptomatic anemia   Transfuse 2 units PRBCs  check CBC 2 hours post transfusion.

## 2018-04-13 NOTE — BRIEF OPERATIVE NOTE - PROCEDURE
<<-----Click on this checkbox to enter Procedure Dilation and curettage for retained products of conception  04/13/2018    Active  [id number]

## 2018-04-14 VITALS — HEART RATE: 98 BPM | DIASTOLIC BLOOD PRESSURE: 49 MMHG | SYSTOLIC BLOOD PRESSURE: 93 MMHG

## 2018-04-14 LAB
HCT VFR BLD CALC: 26.3 % — LOW (ref 34.5–45)
HGB BLD-MCNC: 9 G/DL — LOW (ref 11.5–15.5)
NRBC # BLD: 0 /100 WBCS — SIGNIFICANT CHANGE UP (ref 0–0)

## 2018-04-14 RX ORDER — OXYCODONE HYDROCHLORIDE 5 MG/1
1 TABLET ORAL
Qty: 10 | Refills: 0 | OUTPATIENT
Start: 2018-04-14

## 2018-04-14 RX ORDER — IBUPROFEN 200 MG
1 TABLET ORAL
Qty: 30 | Refills: 0 | OUTPATIENT
Start: 2018-04-14 | End: 2018-05-07

## 2018-04-14 RX ORDER — FERROUS SULFATE 325(65) MG
1 TABLET ORAL
Qty: 60 | Refills: 5 | OUTPATIENT
Start: 2018-04-14 | End: 2018-10-10

## 2018-04-14 RX ADMIN — OXYCODONE AND ACETAMINOPHEN 1 TABLET(S): 5; 325 TABLET ORAL at 09:29

## 2018-04-14 RX ADMIN — OXYCODONE AND ACETAMINOPHEN 1 TABLET(S): 5; 325 TABLET ORAL at 10:20

## 2018-04-14 RX ADMIN — Medication 30 MILLIGRAM(S): at 06:30

## 2018-04-14 NOTE — DISCHARGE NOTE ADULT - PLAN OF CARE
recovery anticipate regular menses to resume in six weeks.  follow up in office in one to two weeks.

## 2018-04-14 NOTE — DISCHARGE NOTE ADULT - MEDICATION SUMMARY - MEDICATIONS TO TAKE
I will START or STAY ON the medications listed below when I get home from the hospital:    ibuprofen 600 mg oral tablet  -- 1 tab(s) by mouth every 6 hours, As needed, Moderate Pain -for moderate pain MDD:4  -- Indication: For for moderate pain    acetaminophen-oxyCODONE 325 mg-5 mg oral tablet  -- 1 tab(s) by mouth every 4 hours, As needed for severe or Moderate postoperative Pain (4 - 6) MDD:8 tablets  -- Indication: For for severe pain only    Feosol 200 mg (65 mg elemental iron) oral tablet  -- 1 tab(s) by mouth 2 times a day for anemia  -- May discolor urine or feces.    -- Indication: For for anemia

## 2018-04-14 NOTE — DISCHARGE NOTE ADULT - CARE PLAN
Principal Discharge DX:	Incomplete   Goal:	recovery  Assessment and plan of treatment:	anticipate regular menses to resume in six weeks.  follow up in office in one to two weeks.

## 2018-04-14 NOTE — PROGRESS NOTE ADULT - SUBJECTIVE AND OBJECTIVE BOX
Patient feeling better, no complaints, asymptomatic  s/p 2U PRBC overnight  Heart and lungs wnl  abdomen soft, non tender  minimal blood on pad, no active bleeding  no calf tenderness  cbc pending

## 2018-04-14 NOTE — DISCHARGE NOTE ADULT - HOSPITAL COURSE
31 yo  at 6-8 weeks gestation with known missed  presented to ER c/o vaginal bleeding after taking first dose of misoprostol.  Pt was noted to have symptomatic anemia and was given one unit of PRBC in the ER.  Transvaginal sonogram showed gestational sac in the cervix.  Gestational sac was removed per vagina by Gyn attending on call and pt was further monitored for bleeding.  Her Hgb dropped from 14 to 6.9 and she was scheduled for repeat sonogram and possible suction dilatation and curettage.  Pt was kept NPO for surgery which was performed on Hospital Day #1 in the evening.  Suction D+C was uneventfully performed.  Pt continued to be tachycardic and hypotensive, repeat Hgb was 6.4.  Two additional units of blood were administered postoperatively appropriately raising her Hgb to 9.0.  She has reported no further active vaginal bleeding.  Pt will be discharged home on Hospital Day #2 if she feels no further symptoms of anemia to follow up within one week.  She has been prescribed iron supplementation and analgesia.  Precautions reviewed with her and her family.

## 2018-04-14 NOTE — DISCHARGE NOTE ADULT - CARE PROVIDER_API CALL
Elder Cordon (DO), Obstetrics and Gynecology  [address] [place] [address]  Phone: [phone]  Fax: [phone]

## 2018-04-14 NOTE — DISCHARGE NOTE ADULT - PATIENT PORTAL LINK FT
You can access the [facility] Patient Portal, offered by [facility], by registering with the following website: [email or web address]

## 2018-04-17 DIAGNOSIS — O03.4 INCOMPLETE SPONTANEOUS ABORTION WITHOUT COMPLICATION: ICD-10-CM

## 2018-04-17 DIAGNOSIS — O02.1 MISSED ABORTION: ICD-10-CM

## 2018-04-17 DIAGNOSIS — D62 ACUTE POSTHEMORRHAGIC ANEMIA: ICD-10-CM

## 2018-04-17 LAB
SURGICAL PATHOLOGY FINAL REPORT - CH: SIGNIFICANT CHANGE UP
SURGICAL PATHOLOGY FINAL REPORT - CH: SIGNIFICANT CHANGE UP

## 2020-11-17 NOTE — ED STATDOCS - ATTENDING CONTRIBUTION TO CARE
0740 Bedside and Verbal shift change report given to LAURI Torres RN (oncoming nurse) by LAURI Alejandro RN (offgoing nurse). Report included the following information SBAR, Intake/Output, MAR and Recent Results. [id number] Dr Dennys White on way to place epidural    0952 Dr Dennys White at bedside for epidural placement      56 Notified Dr Sky Dyson patient starting to push    [phone] Patient in walchers position    1500 Notified Dr Sky Dyson patient wants to take a break from pushing and will labor down for now    1533 Bedside and Verbal shift change report given to DAVID Ramirez RN (oncoming nurse) by Xenia Bailon RN (offgoing nurse). Report included the following information SBAR, Intake/Output, MAR and Recent Results. I personally saw the patient with the ACP, and completed the key components of the history and physical exam. I then discussed the management plan with the ACP.

## 2023-05-23 NOTE — PATIENT PROFILE ADULT. - NSSUBSTANCEUSE_GEN_ALL_CORE_SD
Clinic Care Coordination Contact  Betina Rodriguez RN  You     Okay to dis-enroll      You  Betina Rodriguez RN      CHW has contacted patient x3 with no return call. Please advise for dis enrollment.      Plan: Care Coordinator will send disenrollment letter with care coordinator contact information via [employer]. Care Coordinator will do no further outreaches at this time.    Dora Mott  [facility] Health Worker  [facility] Care Coordination   Yajaira Kruse [place], [place] [facility]  Office: [phone]       
never used

## 2024-08-23 ENCOUNTER — APPOINTMENT (OUTPATIENT)
Dept: ANTEPARTUM | Facility: CLINIC | Age: 39
End: 2024-08-23
Payer: COMMERCIAL

## 2024-08-23 ENCOUNTER — ASOB RESULT (OUTPATIENT)
Age: 39
End: 2024-08-23

## 2024-08-23 PROCEDURE — 76813 OB US NUCHAL MEAS 1 GEST: CPT

## 2024-09-24 ENCOUNTER — NON-APPOINTMENT (OUTPATIENT)
Age: 39
End: 2024-09-24

## 2024-10-16 ENCOUNTER — APPOINTMENT (OUTPATIENT)
Dept: ANTEPARTUM | Facility: CLINIC | Age: 39
End: 2024-10-16

## 2024-10-16 ENCOUNTER — ASOB RESULT (OUTPATIENT)
Age: 39
End: 2024-10-16

## 2024-10-16 PROCEDURE — 76811 OB US DETAILED SNGL FETUS: CPT

## 2024-10-16 PROCEDURE — 76817 TRANSVAGINAL US OBSTETRIC: CPT

## 2025-01-29 NOTE — DISCHARGE NOTE ADULT - NS AS DC STROKE DX YN
Hollingsworth.   no